# Patient Record
Sex: FEMALE | Race: WHITE | NOT HISPANIC OR LATINO | Employment: FULL TIME | URBAN - METROPOLITAN AREA
[De-identification: names, ages, dates, MRNs, and addresses within clinical notes are randomized per-mention and may not be internally consistent; named-entity substitution may affect disease eponyms.]

---

## 2022-02-25 ENCOUNTER — HOSPITAL ENCOUNTER (INPATIENT)
Facility: CLINIC | Age: 27
LOS: 1 days | Discharge: HOME | End: 2022-02-26
Attending: ADVANCED PRACTICE MIDWIFE | Admitting: REGISTERED NURSE

## 2022-02-25 LAB
ABO/RH(D): NORMAL
ANTIBODY SCREEN: NEGATIVE
BASOPHILS # BLD MANUAL: 0 10E3/UL (ref 0–0.2)
BASOPHILS NFR BLD MANUAL: 0 %
EOSINOPHIL # BLD MANUAL: 0 10E3/UL (ref 0–0.7)
EOSINOPHIL NFR BLD MANUAL: 0 %
ERYTHROCYTE [DISTWIDTH] IN BLOOD BY AUTOMATED COUNT: 12.3 % (ref 10–15)
HCT VFR BLD AUTO: 39.1 % (ref 35–47)
HGB BLD-MCNC: 13.8 G/DL (ref 11.7–15.7)
LYMPHOCYTES # BLD MANUAL: 1 10E3/UL (ref 0.8–5.3)
LYMPHOCYTES NFR BLD MANUAL: 4 %
MCH RBC QN AUTO: 31.2 PG (ref 26.5–33)
MCHC RBC AUTO-ENTMCNC: 35.3 G/DL (ref 31.5–36.5)
MCV RBC AUTO: 88 FL (ref 78–100)
MONOCYTES # BLD MANUAL: 0.7 10E3/UL (ref 0–1.3)
MONOCYTES NFR BLD MANUAL: 3 %
NEUTROPHILS # BLD MANUAL: 22.2 10E3/UL (ref 1.6–8.3)
NEUTROPHILS NFR BLD MANUAL: 93 %
PLAT MORPH BLD: ABNORMAL
PLATELET # BLD AUTO: 138 10E3/UL (ref 150–450)
RBC # BLD AUTO: 4.43 10E6/UL (ref 3.8–5.2)
RBC MORPH BLD: ABNORMAL
SARS-COV-2 RNA RESP QL NAA+PROBE: NEGATIVE
SPECIMEN EXPIRATION DATE: NORMAL
WBC # BLD AUTO: 23.9 10E3/UL (ref 4–11)

## 2022-02-25 PROCEDURE — 86780 TREPONEMA PALLIDUM: CPT | Performed by: REGISTERED NURSE

## 2022-02-25 PROCEDURE — 86850 RBC ANTIBODY SCREEN: CPT | Performed by: REGISTERED NURSE

## 2022-02-25 PROCEDURE — 250N000009 HC RX 250

## 2022-02-25 PROCEDURE — U0003 INFECTIOUS AGENT DETECTION BY NUCLEIC ACID (DNA OR RNA); SEVERE ACUTE RESPIRATORY SYNDROME CORONAVIRUS 2 (SARS-COV-2) (CORONAVIRUS DISEASE [COVID-19]), AMPLIFIED PROBE TECHNIQUE, MAKING USE OF HIGH THROUGHPUT TECHNOLOGIES AS DESCRIBED BY CMS-2020-01-R: HCPCS | Performed by: ADVANCED PRACTICE MIDWIFE

## 2022-02-25 PROCEDURE — 722N000001 HC LABOR CARE VAGINAL DELIVERY SINGLE

## 2022-02-25 PROCEDURE — 0UQMXZZ REPAIR VULVA, EXTERNAL APPROACH: ICD-10-PCS | Performed by: REGISTERED NURSE

## 2022-02-25 PROCEDURE — 250N000011 HC RX IP 250 OP 636: Performed by: REGISTERED NURSE

## 2022-02-25 PROCEDURE — 250N000013 HC RX MED GY IP 250 OP 250 PS 637

## 2022-02-25 PROCEDURE — 120N000002 HC R&B MED SURG/OB UMMC

## 2022-02-25 PROCEDURE — 250N000013 HC RX MED GY IP 250 OP 250 PS 637: Performed by: REGISTERED NURSE

## 2022-02-25 PROCEDURE — 258N000003 HC RX IP 258 OP 636

## 2022-02-25 PROCEDURE — 0KQM0ZZ REPAIR PERINEUM MUSCLE, OPEN APPROACH: ICD-10-PCS | Performed by: REGISTERED NURSE

## 2022-02-25 PROCEDURE — 59409 OBSTETRICAL CARE: CPT | Performed by: REGISTERED NURSE

## 2022-02-25 PROCEDURE — 85027 COMPLETE CBC AUTOMATED: CPT | Performed by: REGISTERED NURSE

## 2022-02-25 RX ORDER — TRANEXAMIC ACID 10 MG/ML
1 INJECTION, SOLUTION INTRAVENOUS EVERY 30 MIN PRN
Status: DISCONTINUED | OUTPATIENT
Start: 2022-02-25 | End: 2022-02-26

## 2022-02-25 RX ORDER — ACETAMINOPHEN 325 MG/1
650 TABLET ORAL EVERY 4 HOURS PRN
Status: DISCONTINUED | OUTPATIENT
Start: 2022-02-25 | End: 2022-02-26

## 2022-02-25 RX ORDER — OXYTOCIN 10 [USP'U]/ML
10 INJECTION, SOLUTION INTRAMUSCULAR; INTRAVENOUS
Status: DISCONTINUED | OUTPATIENT
Start: 2022-02-25 | End: 2022-02-26

## 2022-02-25 RX ORDER — PROCHLORPERAZINE MALEATE 10 MG
10 TABLET ORAL EVERY 6 HOURS PRN
Status: DISCONTINUED | OUTPATIENT
Start: 2022-02-25 | End: 2022-02-26

## 2022-02-25 RX ORDER — ONDANSETRON 2 MG/ML
4 INJECTION INTRAMUSCULAR; INTRAVENOUS EVERY 6 HOURS PRN
Status: DISCONTINUED | OUTPATIENT
Start: 2022-02-25 | End: 2022-02-26

## 2022-02-25 RX ORDER — METOCLOPRAMIDE HYDROCHLORIDE 5 MG/ML
10 INJECTION INTRAMUSCULAR; INTRAVENOUS EVERY 6 HOURS PRN
Status: DISCONTINUED | OUTPATIENT
Start: 2022-02-25 | End: 2022-02-26

## 2022-02-25 RX ORDER — IBUPROFEN 600 MG/1
600 TABLET, FILM COATED ORAL
Status: DISCONTINUED | OUTPATIENT
Start: 2022-02-25 | End: 2022-02-26

## 2022-02-25 RX ORDER — OXYTOCIN/0.9 % SODIUM CHLORIDE 30/500 ML
100-340 PLASTIC BAG, INJECTION (ML) INTRAVENOUS CONTINUOUS PRN
Status: DISCONTINUED | OUTPATIENT
Start: 2022-02-25 | End: 2022-02-26

## 2022-02-25 RX ORDER — SODIUM CHLORIDE, SODIUM LACTATE, POTASSIUM CHLORIDE, CALCIUM CHLORIDE 600; 310; 30; 20 MG/100ML; MG/100ML; MG/100ML; MG/100ML
INJECTION, SOLUTION INTRAVENOUS CONTINUOUS PRN
Status: DISCONTINUED | OUTPATIENT
Start: 2022-02-25 | End: 2022-02-26

## 2022-02-25 RX ORDER — KETOROLAC TROMETHAMINE 30 MG/ML
30 INJECTION, SOLUTION INTRAMUSCULAR; INTRAVENOUS
Status: DISCONTINUED | OUTPATIENT
Start: 2022-02-25 | End: 2022-02-26

## 2022-02-25 RX ORDER — NALOXONE HYDROCHLORIDE 0.4 MG/ML
0.2 INJECTION, SOLUTION INTRAMUSCULAR; INTRAVENOUS; SUBCUTANEOUS
Status: DISCONTINUED | OUTPATIENT
Start: 2022-02-25 | End: 2022-02-26

## 2022-02-25 RX ORDER — OXYTOCIN 10 [USP'U]/ML
INJECTION, SOLUTION INTRAMUSCULAR; INTRAVENOUS
Status: DISCONTINUED
Start: 2022-02-25 | End: 2022-02-25 | Stop reason: WASHOUT

## 2022-02-25 RX ORDER — PROCHLORPERAZINE 25 MG
25 SUPPOSITORY, RECTAL RECTAL EVERY 12 HOURS PRN
Status: DISCONTINUED | OUTPATIENT
Start: 2022-02-25 | End: 2022-02-26

## 2022-02-25 RX ORDER — CARBOPROST TROMETHAMINE 250 UG/ML
250 INJECTION, SOLUTION INTRAMUSCULAR
Status: DISCONTINUED | OUTPATIENT
Start: 2022-02-25 | End: 2022-02-26

## 2022-02-25 RX ORDER — PRENATAL VIT/IRON FUM/FOLIC AC 27MG-0.8MG
1 TABLET ORAL DAILY
COMMUNITY

## 2022-02-25 RX ORDER — MISOPROSTOL 200 UG/1
800 TABLET ORAL
Status: DISCONTINUED | OUTPATIENT
Start: 2022-02-25 | End: 2022-02-26

## 2022-02-25 RX ORDER — LIDOCAINE 40 MG/G
CREAM TOPICAL
Status: DISCONTINUED | OUTPATIENT
Start: 2022-02-25 | End: 2022-02-26

## 2022-02-25 RX ORDER — METHYLERGONOVINE MALEATE 0.2 MG/ML
200 INJECTION INTRAVENOUS
Status: DISCONTINUED | OUTPATIENT
Start: 2022-02-25 | End: 2022-02-26

## 2022-02-25 RX ORDER — SODIUM CHLORIDE, SODIUM LACTATE, POTASSIUM CHLORIDE, CALCIUM CHLORIDE 600; 310; 30; 20 MG/100ML; MG/100ML; MG/100ML; MG/100ML
INJECTION, SOLUTION INTRAVENOUS CONTINUOUS
Status: DISCONTINUED | OUTPATIENT
Start: 2022-02-25 | End: 2022-02-26

## 2022-02-25 RX ORDER — MISOPROSTOL 200 UG/1
TABLET ORAL
Status: COMPLETED
Start: 2022-02-25 | End: 2022-02-25

## 2022-02-25 RX ORDER — NALOXONE HYDROCHLORIDE 0.4 MG/ML
0.4 INJECTION, SOLUTION INTRAMUSCULAR; INTRAVENOUS; SUBCUTANEOUS
Status: DISCONTINUED | OUTPATIENT
Start: 2022-02-25 | End: 2022-02-26

## 2022-02-25 RX ORDER — METOCLOPRAMIDE 10 MG/1
10 TABLET ORAL EVERY 6 HOURS PRN
Status: DISCONTINUED | OUTPATIENT
Start: 2022-02-25 | End: 2022-02-26

## 2022-02-25 RX ORDER — OXYTOCIN/0.9 % SODIUM CHLORIDE 30/500 ML
PLASTIC BAG, INJECTION (ML) INTRAVENOUS
Status: COMPLETED
Start: 2022-02-25 | End: 2022-02-25

## 2022-02-25 RX ORDER — SODIUM CHLORIDE, SODIUM LACTATE, POTASSIUM CHLORIDE, CALCIUM CHLORIDE 600; 310; 30; 20 MG/100ML; MG/100ML; MG/100ML; MG/100ML
INJECTION, SOLUTION INTRAVENOUS
Status: COMPLETED
Start: 2022-02-25 | End: 2022-02-25

## 2022-02-25 RX ORDER — OXYTOCIN/0.9 % SODIUM CHLORIDE 30/500 ML
340 PLASTIC BAG, INJECTION (ML) INTRAVENOUS CONTINUOUS PRN
Status: DISCONTINUED | OUTPATIENT
Start: 2022-02-25 | End: 2022-02-26

## 2022-02-25 RX ORDER — MISOPROSTOL 200 UG/1
400 TABLET ORAL
Status: DISCONTINUED | OUTPATIENT
Start: 2022-02-25 | End: 2022-02-26

## 2022-02-25 RX ORDER — ONDANSETRON 4 MG/1
4 TABLET, ORALLY DISINTEGRATING ORAL EVERY 6 HOURS PRN
Status: DISCONTINUED | OUTPATIENT
Start: 2022-02-25 | End: 2022-02-26

## 2022-02-25 RX ORDER — LIDOCAINE HYDROCHLORIDE 10 MG/ML
INJECTION, SOLUTION EPIDURAL; INFILTRATION; INTRACAUDAL; PERINEURAL
Status: COMPLETED
Start: 2022-02-25 | End: 2022-02-25

## 2022-02-25 RX ORDER — OXYTOCIN/0.9 % SODIUM CHLORIDE 30/500 ML
1-24 PLASTIC BAG, INJECTION (ML) INTRAVENOUS CONTINUOUS
Status: DISCONTINUED | OUTPATIENT
Start: 2022-02-25 | End: 2022-02-26

## 2022-02-25 RX ADMIN — LIDOCAINE HYDROCHLORIDE 20 ML: 10 INJECTION, SOLUTION EPIDURAL; INFILTRATION; INTRACAUDAL; PERINEURAL at 19:41

## 2022-02-25 RX ADMIN — IBUPROFEN 600 MG: 600 TABLET, FILM COATED ORAL at 20:40

## 2022-02-25 RX ADMIN — ACETAMINOPHEN 650 MG: 325 TABLET, FILM COATED ORAL at 23:30

## 2022-02-25 RX ADMIN — MISOPROSTOL 800 MCG: 200 TABLET ORAL at 19:31

## 2022-02-25 RX ADMIN — SODIUM CHLORIDE, POTASSIUM CHLORIDE, SODIUM LACTATE AND CALCIUM CHLORIDE 500 ML: 600; 310; 30; 20 INJECTION, SOLUTION INTRAVENOUS at 17:38

## 2022-02-25 RX ADMIN — Medication 2 MILLI-UNITS/MIN: at 18:04

## 2022-02-25 RX ADMIN — METHYLERGONOVINE MALEATE 200 MCG: 0.2 INJECTION INTRAVENOUS at 19:29

## 2022-02-25 ASSESSMENT — ACTIVITIES OF DAILY LIVING (ADL)
TOILETING_ISSUES: NO
CONCENTRATING,_REMEMBERING_OR_MAKING_DECISIONS_DIFFICULTY: NO
WEAR_GLASSES_OR_BLIND: NO
DOING_ERRANDS_INDEPENDENTLY_DIFFICULTY: NO
WALKING_OR_CLIMBING_STAIRS_DIFFICULTY: NO
CHANGE_IN_FUNCTIONAL_STATUS_SINCE_ONSET_OF_CURRENT_ILLNESS/INJURY: NO
DRESSING/BATHING_DIFFICULTY: NO
DIFFICULTY_EATING/SWALLOWING: NO
FALL_HISTORY_WITHIN_LAST_SIX_MONTHS: NO

## 2022-02-25 NOTE — H&P
"ADMIT NOTE  =================  41w0d    Rosita Parmar is a 26 year old female with an No LMP recorded. Patient is pregnant. and Estimated Date of Delivery: 2022 is [facility] to the [facility] on 2022 at 5:21 PM with prolonged second stage.     HPI  ================  Patient with planned Out of Hospital Birth is transferring by car to the hospital for maternal exhaustion and prolonged second stage of labor  Patient has been [facility] by [facility] for prenatal care.  Transferring midwife name(s),/birth center & phone number: Deb Sepulveda; [phone]  Midwife here supporting patient: Here with her Angy Schwarz  Records received, reviewed and scanned into chart.    [employer] transfer tool was used    Presents here with spouse and . Reports that her contractions started around 0100 and had AROM at approx 0400 for clear fluid. She was 9.5cm and began pushing at 1035 this morning, she continued to push on and off until 1530 when recommendation was made to transfer care. Her prenatal course was uncomplicated.    Contractions- moderate  Fetal movement- active  ROM- yes, clear. SROM @ 0400   Vaginal bleeding- small  GBS- negative  FOB- is involved, Carlo  Other labor support- Angy Schwarz    Weight gain- 142 lbs pre-pregnant, no documented gain in prenatal care records  Total weight gain- unknown   Height- 5'8\"  BMI- 21.6  First prenatal visit at 18 weeks, Total visits- 9  Received prenatal care through [facility].     PROBLEM LIST  =================  Patient Active Problem List    Diagnosis Date Noted     Labor and delivery, indication for care 2022     Priority: Medium     HISTORIES  ============  No Known Allergies  No past medical history on file.  No past surgical history on file..  No family history on file.  Social History     Tobacco Use     Smoking status: Not on file     Smokeless tobacco: Not on file   Substance Use Topics     Alcohol use: Not on file     OB " History    Para Term  AB Living   1 0 0 0 0 0   SAB IAB Ectopic Multiple Live Births   0 0 0 0 0      # Outcome Date GA Lbr Ian/2nd Weight Sex Delivery Anes PTL Lv   1 Current                 LABS:   ===========  Prenatal Labs reviewed per transfer records: Yes  Rhogam not indicated  ABO/ RH: O POS  Rubella NON-immune   HBsAg: negative   HIV: negative   RPR: NR   GCT: date2021, result 99  GBS: date 22, result Negative  OTHER: n/a    Lab Results   Component Value Date    HGB 13.8 2022     Other labs:  Results for orders placed or performed during the hospital encounter of 22 (from the past 24 hour(s))   ABO/Rh type and screen    Narrative    The following orders were created for panel order ABO/Rh type and screen.  Procedure                               Abnormality         Status                     ---------                               -----------         ------                     Adult Type and Screen[[id number]]                            In process                   Please view results for these tests on the individual orders.   CBC with platelets differential    Narrative    The following orders were created for panel order CBC with platelets differential.  Procedure                               Abnormality         Status                     ---------                               -----------         ------                     CBC with platelets and d...[[id number]]  Abnormal            Preliminary result           Please view results for these tests on the individual orders.   CBC with platelets and differential   Result Value Ref Range    WBC Count 23.9 (H) 4.0 - 11.0 10e3/uL    RBC Count 4.43 3.80 - 5.20 10e6/uL    Hemoglobin 13.8 11.7 - 15.7 g/dL    Hematocrit 39.1 35.0 - 47.0 %    MCV 88 78 - 100 fL    MCH 31.2 26.5 - 33.0 pg    MCHC 35.3 31.5 - 36.5 g/dL    RDW 12.3 10.0 - 15.0 %    Platelet Count 138 (L) 150 - 450 10e3/uL       ULTRASOUND  =============  Date  "10/08/2021, result normal fetal survey    ROS  =========  Pt denies significant respiratory, cardiovacular, GI, or muscular/skeletalcomplaints.    See RN data base ROS.     PHYSICAL EXAM:  ===============  Ht 1.727 m (5' 8\")   Wt 64.4 kg (142 lb)   BMI 21.59 kg/m    General appearance: uncomfortable with contractions  GENERAL APPEARANCE: healthy, alert and no distress  RESP: breathing comfortably on room air  CV: well perfused  ABDOMEN:  soft, nontender, no epigastric pain  SKIN: no suspicious lesions or rashes  NEURO: Denies headache, blurred vision, other vision changes  PSYCH: mentation appears normal. and affect normal/bright  Legs: No edema     Abdomen: gravid, vertex fetus per Leopold's, non-tender between contractions.   Cephalic presentation confirmed by digital exam, sutures palpated, LOP  EFW-  7.5 lbs.   CONTACTIONS: every 5-8 minutes  FETAL HEART TONES: continuous EFM- baseline 120 with moderate variability and positive accelerations. Intermittent variable and late decelerations.   PELVIC EXAM: 10/ 100%/ +1; +2 with pushing  BLOODY SHOW: yes    ROM:yes, clear SROM @ 0400  FLUID: clear  ROMPLUS: not done    # Pain Assessment:   - Rosita is experiencing pain due to contractions. Pain management was discussed with Rosita and her spouse and the plan was created in a collaborative fashion.  Rosita's response to the current recommendations: engaged  - Declines medication mgmt prefers position changes and breathing techniques    ASSESSMENT:  ==============  IUP @ 41w0d admitted in second stage   NST NOT DONE   Fetal Heart Rate - category two  GBS- negative  Prolonged second stage  Irregular contraction pattern   COVID unknown      PLAN:  ===========  Admit - see IP orders  Reviewed FHR tracing with pt, laury and SO. IV fluid bolus given.  Discussed labor augmentation with pitocin, reviewed risks/benefits. Pt is agreeable. Orders placed. Discussed that contractions are spaced and encouraged pt not to push " until contractions are closer together. Pt in agreement.  Dr. Owens updated and aware of category 2 tracing, moderate variability with intermittent late and variable decels, improved by position changes and IV fluids. MD on call and agrees with plan.  Anticipate   Reevaluate in 30 minutes/sooner PRN    CAROL Eduardo CNM

## 2022-02-26 VITALS
WEIGHT: 142 LBS | TEMPERATURE: 97.5 F | DIASTOLIC BLOOD PRESSURE: 79 MMHG | HEART RATE: 66 BPM | BODY MASS INDEX: 21.52 KG/M2 | RESPIRATION RATE: 16 BRPM | OXYGEN SATURATION: 99 % | HEIGHT: 68 IN | SYSTOLIC BLOOD PRESSURE: 120 MMHG

## 2022-02-26 LAB
HGB BLD-MCNC: 12.7 G/DL (ref 11.7–15.7)
T PALLIDUM AB SER QL: NONREACTIVE

## 2022-02-26 PROCEDURE — 36415 COLL VENOUS BLD VENIPUNCTURE: CPT | Performed by: REGISTERED NURSE

## 2022-02-26 PROCEDURE — 85018 HEMOGLOBIN: CPT | Performed by: REGISTERED NURSE

## 2022-02-26 PROCEDURE — 250N000013 HC RX MED GY IP 250 OP 250 PS 637: Performed by: REGISTERED NURSE

## 2022-02-26 RX ORDER — HYDROCORTISONE 2.5 %
CREAM (GRAM) TOPICAL 3 TIMES DAILY PRN
Status: DISCONTINUED | OUTPATIENT
Start: 2022-02-26 | End: 2022-02-26 | Stop reason: HOSPADM

## 2022-02-26 RX ORDER — METHYLERGONOVINE MALEATE 0.2 MG/ML
200 INJECTION INTRAVENOUS
Status: DISCONTINUED | OUTPATIENT
Start: 2022-02-26 | End: 2022-02-26 | Stop reason: HOSPADM

## 2022-02-26 RX ORDER — MODIFIED LANOLIN
OINTMENT (GRAM) TOPICAL
Status: DISCONTINUED | OUTPATIENT
Start: 2022-02-26 | End: 2022-02-26 | Stop reason: HOSPADM

## 2022-02-26 RX ORDER — IBUPROFEN 800 MG/1
800 TABLET, FILM COATED ORAL EVERY 6 HOURS PRN
Status: DISCONTINUED | OUTPATIENT
Start: 2022-02-26 | End: 2022-02-26 | Stop reason: HOSPADM

## 2022-02-26 RX ORDER — MISOPROSTOL 200 UG/1
800 TABLET ORAL
Status: DISCONTINUED | OUTPATIENT
Start: 2022-02-26 | End: 2022-02-26 | Stop reason: HOSPADM

## 2022-02-26 RX ORDER — CARBOPROST TROMETHAMINE 250 UG/ML
250 INJECTION, SOLUTION INTRAMUSCULAR
Status: DISCONTINUED | OUTPATIENT
Start: 2022-02-26 | End: 2022-02-26 | Stop reason: HOSPADM

## 2022-02-26 RX ORDER — ACETAMINOPHEN 325 MG/1
650 TABLET ORAL EVERY 6 HOURS PRN
Qty: 100 TABLET | Refills: 0 | COMMUNITY
Start: 2022-02-26

## 2022-02-26 RX ORDER — DOCUSATE SODIUM 100 MG/1
100 CAPSULE, LIQUID FILLED ORAL DAILY
Status: DISCONTINUED | OUTPATIENT
Start: 2022-02-26 | End: 2022-02-26 | Stop reason: HOSPADM

## 2022-02-26 RX ORDER — IBUPROFEN 600 MG/1
600 TABLET, FILM COATED ORAL EVERY 6 HOURS PRN
Qty: 60 TABLET | Refills: 0 | COMMUNITY
Start: 2022-02-26

## 2022-02-26 RX ORDER — MISOPROSTOL 200 UG/1
400 TABLET ORAL
Status: DISCONTINUED | OUTPATIENT
Start: 2022-02-26 | End: 2022-02-26 | Stop reason: HOSPADM

## 2022-02-26 RX ORDER — OXYTOCIN 10 [USP'U]/ML
10 INJECTION, SOLUTION INTRAMUSCULAR; INTRAVENOUS
Status: DISCONTINUED | OUTPATIENT
Start: 2022-02-26 | End: 2022-02-26 | Stop reason: HOSPADM

## 2022-02-26 RX ORDER — ACETAMINOPHEN 325 MG/1
650 TABLET ORAL EVERY 4 HOURS PRN
Status: DISCONTINUED | OUTPATIENT
Start: 2022-02-26 | End: 2022-02-26 | Stop reason: HOSPADM

## 2022-02-26 RX ORDER — AMOXICILLIN 250 MG
1 CAPSULE ORAL DAILY
Qty: 100 TABLET | Refills: 0 | COMMUNITY
Start: 2022-02-26

## 2022-02-26 RX ORDER — OXYTOCIN/0.9 % SODIUM CHLORIDE 30/500 ML
340 PLASTIC BAG, INJECTION (ML) INTRAVENOUS CONTINUOUS PRN
Status: DISCONTINUED | OUTPATIENT
Start: 2022-02-26 | End: 2022-02-26 | Stop reason: HOSPADM

## 2022-02-26 RX ORDER — OMEGA-3/DHA/EPA/FISH OIL 60 MG-90MG
CAPSULE ORAL DAILY
COMMUNITY

## 2022-02-26 RX ORDER — TRANEXAMIC ACID 10 MG/ML
1 INJECTION, SOLUTION INTRAVENOUS EVERY 30 MIN PRN
Status: DISCONTINUED | OUTPATIENT
Start: 2022-02-26 | End: 2022-02-26 | Stop reason: HOSPADM

## 2022-02-26 RX ORDER — BISACODYL 10 MG
10 SUPPOSITORY, RECTAL RECTAL DAILY PRN
Status: DISCONTINUED | OUTPATIENT
Start: 2022-02-26 | End: 2022-02-26 | Stop reason: HOSPADM

## 2022-02-26 RX ORDER — ERGOCALCIFEROL (VITAMIN D2) 10 MCG
TABLET ORAL DAILY
COMMUNITY

## 2022-02-26 RX ADMIN — ACETAMINOPHEN 650 MG: 325 TABLET, FILM COATED ORAL at 07:45

## 2022-02-26 RX ADMIN — ACETAMINOPHEN 650 MG: 325 TABLET, FILM COATED ORAL at 02:56

## 2022-02-26 RX ADMIN — IBUPROFEN 800 MG: 800 TABLET, FILM COATED ORAL at 09:56

## 2022-02-26 RX ADMIN — IBUPROFEN 800 MG: 800 TABLET, FILM COATED ORAL at 02:56

## 2022-02-26 NOTE — PLAN OF CARE
Data: Rosita Parmar transferred to [place] [address] via wheelchair at 2230. Baby transferred via parent's arms.  Action: Receiving unit notified of transfer: Yes. Patient and family notified of room change. Report given by Kathrin SANCHEZ RN prior to transfer. Belongings sent to receiving unit. Accompanied by Registered Nurse. Oriented patient to surroundings. Call light within reach. ID bands double-checked with receiving RN.  Response: Patient tolerated transfer and is stable.      Data: Vital signs within normal limits. Postpartum checks within normal limits - see flow record. Patient eating and drinking normally. Patient able to empty bladder independently and is up ambulating. No apparent signs of infection. Perineum healing well. Patient has ice placed.  Patient performing self cares and is able to care for infant.  Breastfeeding is the feeding plan.    Action: Patient medicated during the shift for pain. See MAR. Patient reassessed within 1 hour after each medication and pain was improved - patient stated she was comfortable. Patient education done about breastfeeding. See flow record.  Response: Positive attachment behaviors observed with infant. Support persons was present and attentive.   Plan: Anticipate discharge on as soon as possible.

## 2022-02-26 NOTE — PLAN OF CARE
Pt admitted from home after laboring/pushing. Had been complete and pushing on/off since about 1030 this morning. Monitors placed, admission done as able. EFM showed late decelerations initially to the 70-80s. IV started with bolus, pt to left side with improvement. Pitocin started per protocol. Contractions increased from 4-7 minutes apart to 2-5 minutes. Pt began to push again. Prolonged decel to 80s with pushing, slowly increased to 100-110 until delivery. Pt to left side. HUNTER Cardona CNM at bedside for pushing. Viable baby boy born via  at 1924 with NICU present for delivery and DUSTIN Allan RN present after for nursery cares. Postpartum recovery WNL after methergine and cytotec given for increased bleeding. VSS. Pt able to void, pain controlled with ibuprofen. -140/70-80s. Updated HUNTER Cardona CNM.    Data: Rosita Parmar transferred to [id number] via wheelchair at 2230. Baby transferred via parent's arms.  Action: Receiving unit notified of transfer: Yes. Patient and family notified of room change. Report given to MILTON Sosa at prior to transfer. Belongings sent to receiving unit. Accompanied by Registered Nurse. Oriented patient to surroundings. Call light within reach. ID bands double-checked with receiving RN.  Response: Patient tolerated transfer and is stable.

## 2022-02-26 NOTE — PLAN OF CARE
"Goal Outcome Evaluation:  VSS. Postpartum assessment WNL. C/o perineal pain, well managed with ibuprofen and tylenol and use of tucks and ice pads. EDS of 8, with answer of \"1\" on question 10. Provider and SW notified. SW contacted pt via phone. Pt states she has breast pump already at home. Pt watched required educational videos. Birth certificate completed. Reviewed AVS. Pt discharged to home with spouse and baby.                           "

## 2022-02-26 NOTE — PROGRESS NOTES
"On this date, MARSHA received page from RN about concerns with a score of a 1 on the last question of the EDS screener. SW contacted Rosita via bedside phone to discuss score. Rosita denied being surprised about her score and informed MARSHA that she has experienced a number of losses in her life and near the end of pregnancy. Rosita informed MARSHA that she has had thoughts in the past of harming herself and has \"painful thoughts\" during her pregnancy. Rosita informed MARSHA that she recognized these thoughts and discovered a grief support therapist and specialist to address her mental health. Rosita also attends a grief support group. Both services are weekly. Rosita denied the need for medication, although it has been discussed among her team. Rosita denied the need for additional resources at this time to address any current stressors.     No further follow-up needs identified at this time.         CHERYL Burnette, MARTÍN, [facility] [place]  Pediatric Float    Office: [phone]  Email: [email or web address]  After hours and weekend pager: [phone]  *NO LETTER*    "

## 2022-02-26 NOTE — DISCHARGE SUMMARY
[facility] Discharge Summary    Rosita Parmar MRN# [id number]   Age: 26 year old YOB: 1995     Date of Admission:  2022  Date of Discharge::  2022  Admitting Physician:  Savi Grady, CAROL ROSEN  Discharge Physician:  CAROL Sánchez CNM, CNBENOIT, MS      [facility] clinic: [facility]           Admission Diagnoses:   Labor and delivery, indication for care [O75.9]  Prolonged second stage          Discharge Diagnosis:     Normal spontaneous vaginal delivery at 41w0d  Second degree perineal laceration          Procedures:     Procedure(s): Repair of second degree perineal laceration                Medications Prior to Admission:     Medications Prior to Admission   Medication Sig Dispense Refill Last Dose     Bioflavonoid Products (VITAMIN C) CHEW Take by mouth daily        calcium-magnesium (CALMAG) 500-250 MG TABS per tablet Take 1 tablet by mouth daily        fish oil-omega-3 fatty acids 500 MG capsule Take by mouth daily        Prenatal Vit-Fe Fumarate-FA (PRENATAL MULTIVITAMIN W/IRON) 27-0.8 MG tablet Take 1 tablet by mouth daily        Vitamin D, Cholecalciferol, 10 MCG (400 UNIT) TABS Take by mouth daily                Discharge Medications:     Current Discharge Medication List      START taking these medications    Details   acetaminophen (TYLENOL) 325 MG tablet Take 2 tablets (650 mg) by mouth every 6 hours as needed for mild pain Start after Delivery.  Qty: 100 tablet, Refills: 0    Associated Diagnoses:  (normal spontaneous vaginal delivery)      ibuprofen (ADVIL/MOTRIN) 600 MG tablet Take 1 tablet (600 mg) by mouth every 6 hours as needed for moderate pain Start after delivery  Qty: 60 tablet, Refills: 0    Associated Diagnoses:  (normal spontaneous vaginal delivery)      senna-docusate (SENOKOT-S/PERICOLACE) 8.6-50 MG tablet Take 1 tablet by mouth daily Start after delivery.  Qty: 100 tablet, Refills: 0    Associated Diagnoses:  (normal spontaneous vaginal  delivery)         CONTINUE these medications which have NOT CHANGED    Details   Bioflavonoid Products (VITAMIN C) CHEW Take by mouth daily      calcium-magnesium (CALMAG) 500-250 MG TABS per tablet Take 1 tablet by mouth daily      fish oil-omega-3 fatty acids 500 MG capsule Take by mouth daily      Prenatal Vit-Fe Fumarate-FA (PRENATAL MULTIVITAMIN W/IRON) 27-0.8 MG tablet Take 1 tablet by mouth daily      Vitamin D, Cholecalciferol, 10 MCG (400 UNIT) TABS Take by mouth daily                   Consultations:   No consultations were requested during this admission          Brief History of Labor:   Labor Course:   Per homebirth CPM report: patient's contractions started this morning at 0100. At 0400 SROM, clear. At 1035 lip, pushing started. At 1530 call to [facility] to JUANCARLOS, prolonged second stage and maternal exhaustion, some audible decreases in FHR with doppler. At 1646 admit to [facility], IV inserted, bolus, cat 2 tracing, declined exam. At 1745 SVE 10/100/+2, LOP, good movement with pushing, contractions traced every 5-8min. Pitocin started due to dysfunctional contraction pattern.      Delivery Course:  Pt became complete at 1130 and started pushing 1130 this morning. She was transferred from a homebirth CPM at 1645 due to contraction spacing and maternal exhaustion from pushing  (See HPI for further details). Pushing resumed at 1906. Pushed to  @ 1908. NICU called to delivery due to Cat II FHR tracing. She started pushing in semi-fowlers per her request but audible prolonged FHR deceleration heard in the 90s when traced and CNM request for side lying positioning. Patient felt another contraction and continued to push in semi-fowlers for 1 contraction before turning to left lateral at 1913. Continued to push with good effort through two more contractions. FHR audible in 100s @ 1917 between contractions. Urged patient to push with greatest effort possible due to FHR. Patient pushed with coaching with contraction  at 1919. FHR baseline improved to 115bpm between 6499-4929. Delivered a vigorous baby male at 1924 who was immediately placed on mom's abdomen. Nuchal x1 loose and delivered through. IV pitocin incrased after delivery of infant per protocol. Umbilical cord was left intact per patient request. Unable to cut cord segment cut for gases due to patient request for it to be left intact. Cord blood unable to be obtained due to intact cord. Placenta spontaneously delivered intact at 192 without difficulty via Schultze mechanism. Brisk bleeding noted after delivery of placenta despite uterine massage. 800mcg rectal misoprostol and 0.2mg IM methergine given with patient consent. After administration, bleeding slowed and fundus was firm, midline. Inspection of vagina and perineum revealed a 2nd degree laceration and left labial. 2nd degree laceration was repaired in the usual fashion with 3-0 vicryl. Left labial laceration was repaired with single interrupted 4-0 vicryl suture. 1% lidocaine was infiltrated before the repair.  At the completion of repairs, fundus is firm and midline.  Mom and baby are stable.      IUP at 41 weeks gestation delivered on 2022.    , with 2nd degree laceration delivery of a viable Male infant.  Weight : 7lbs 0.9oz  Apgars of 8 at 1 minute and 9 at 5 minutes.  Labor was augmented.  Medications administered  in labor:  Pain Rx none; Antibiotics No  Perineum: 2nd degree and Labial laceration  Placenta-mechanism: spontaneous, intact,  with a 3 vessel cord. IV oxytocin was given. Rectal misoprostol was given. IM methergine given.  QBL was 809.  Anticipated Discharge Date: 22  Complications of pregnancy, labor and delivery: Dysfunctional labor  Birth attendants:Nivia Cardona, CAROL, CNM     Assessment Day of Discharge      Breasts: soft, filling  Nipples:intact, without lesion  Fundus: firm @ umbilicus, midline, nontender  Abdomen: soft, nontender  Lochia: small rubra, no  "clots,  No odor  Perineum: well approximated, healing well, no erythema, edema, bruising, hematoma or s/s of infection. 1cm hemorrhoid  Legs: no edema, nontender           Hospital Course:     INTERVAL HISTORY:  /79 (BP Location: Left arm, Patient Position: Supine, Cuff Size: Adult Regular)   Pulse 66   Temp 97.5  F (36.4  C) (Oral)   Resp 16   Ht 1.727 m (5' 8\")   Wt 64.4 kg (142 lb)   SpO2 99%   Breastfeeding Unknown   BMI 21.59 kg/m     No fever  Pt stable, baby [place] is rooming in. They are planning to discharge this morning and continue care with their home birth midwife  Breast feeding status: initiated. Seen at right breast with deep latch, non painful.   Complications since 2 hours post delivery: None  Patient is tolerating activity well, and normal diet. Voiding without difficulty, loose bowel movement yet, cramping is relieved by Ibuprofen, lochia is decreasing and patient denies clots.  Perineal pain is is improved by Ibuprofen and acetaminophen, but still sore.      Postpartum hemoglobin   Recent Labs   Lab 02/26/22  0753 02/25/22  1722   HGB 12.7 13.8      Blood type: O positive   Rubella status: Not immune - considering MMR   Postpartum depression warning signs reviewed.     ASSESSMENT/PLAN:  Normal postpartum exam , Stable Post-partum day #1  Complications:vaccination status Rubella non-immune, plan MMR prior to discharge if patient is agreeable  Home Visit Ordered- No, planning follow-up and postpartum care with [facility].   Plan d/c home today  Follow-up: later today scheduled with home birth midwife   Teaching done: D/C Instructions: Nutrition/Activity, Engorgement Management, Warning Signs/When to Call: Excessive Bleeding, Infection, PP Depression, RTC Clinic for PP Appointment and PNV     Reviewed blood pressure warning signs including headache, vision changes, RUQ/epigastric pain, N&V, or sudden swelling.     Continue prenatal vitamins  Discharge medications ordered- " "none           Discharge Instructions and Follow-Up:     Discharge diet: Regular   Discharge activity: Pelvic rest: abstain from intercourse and do not use tampons for 6 week(s)   Discharge follow-up: Follow up with [facility] later today and as previously scheduled   Wound care: Drink plenty of fluids  Ice to area for comfort  Keep wound clean and dry  Warm sitz baths  Witch hazel pads           Discharge Disposition:     Discharged to home        CAROL Sánchez, SILAS    ADDEDNDUM at 1019:   Page from RN indicates patient completed EDS with score of 8 including answering 1, \"hardly ever\" to question 10, \"Thought of hurting myself.\" RN enter social work consult per protocol. SW plans to discuss with patient prior to discharge.     CAROL Sánchez CNM           "

## 2022-02-26 NOTE — L&D DELIVERY NOTE
Delivery Summary    Rosita Parmar MRN# [id number]   Age: 26 year old YOB: 1995     ASSESSMENT & PLAN:     Delivery Note    Labor Course:   Per homebirth CPM report: patient's contractions started this morning at 0100. At 0400 SROM, clear. At 1035 lip, pushing started. At 1530 call to [facility] to JUANCARLOS, prolonged second stage and maternal exhaustion, some audible decreases in FHR with doppler. At 1646 admit to [facility], IV inserted, bolus, cat 2 tracing, declined exam. At 1745 SVE 10/100/+2, LOP, good movement with pushing, contractions traced every 5-8min. Pitocin started due to dysfunctional contraction pattern.     Delivery Course:  Pt became complete at 1130 and started pushing 1130 this morning. She was transferred from a homebirth CPM at 1645 due to contraction spacing and maternal exhaustion from pushing  (See HPI for further details). Pushing resumed at 1906. Pushed to  @ 1908. NICU called to delivery due to Cat II FHR tracing. She started pushing in semi-fowlers per her request but audible prolonged FHR deceleration heard in the 90s when traced and CNM request for side lying positioning. Patient felt another contraction and continued to push in semi-fowlers for 1 contraction before turning to left lateral at 1913. Continued to push with good effort through two more contractions. FHR audible in 100s @ 1917 between contractions. Urged patient to push with greatest effort possible due to FHR. Patient pushed with coaching with contraction at 1919. FHR baseline improved to 115bpm between 4273-0406. Delivered a vigorous baby male at 1924 who was immediately placed on mom's abdomen. Nuchal x1 loose and delivered through. IV pitocin incrased after delivery of infant per protocol. Umbilical cord was left intact per patient request. Unable to cut cord segment cut for gases due to patient request for it to be left intact. Cord blood unable to be obtained due to intact cord. Placenta spontaneously  delivered intact at 1928 without difficulty via Schultze mechanism. Brisk bleeding noted after delivery of placenta despite uterine massage. 800mcg rectal misoprostol and 0.2mg IM methergine given with patient consent. After administration, bleeding slowed and fundus was firm, midline. Inspection of vagina and perineum revealed a 2nd degree laceration and left labial. 2nd degree laceration was repaired in the usual fashion with 3-0 vicryl. Left labial laceration was repaired with single interrupted 4-0 vicryl suture. 1% lidocaine was infiltrated before the repair.  At the completion of repairs, fundus is firm and midline.  Mom and baby are stable.      IUP at 41 weeks gestation delivered on 2022.    , with 2nd degree laceration delivery of a viable Male infant.  Weight : 7lbs 0.9oz  Apgars of 8 at 1 minute and 9 at 5 minutes.  Labor was augmented.  Medications administered  in labor:  Pain Rx none; Antibiotics No  Perineum: 2nd degree and Labial laceration  Placenta-mechanism: spontaneous, intact,  with a 3 vessel cord. IV oxytocin was given. Rectal misoprostol was given. IM methergine given.  QBL was 809.  Anticipated Discharge Date: 22  Complications of pregnancy, labor and delivery: Dysfunctional labor  Birth attendants:CAROL Pyle, Jaycee Baker [[id number]]    Labor Event Times    Labor onset date: 22 Onset time:  1:00 AM   Dilation complete date: 22 Complete time: 11:30 AM   Start pushing date/time: 2022 1130      Labor Events     labor?: No   steroids: None  Labor Type: Spontaneous  Predominate monitoring during 1st stage: intermittent auscultation, continuous electronic fetal monitoring     Rupture date/time: 22 0400   Rupture type: Spontaneous rupture of membranes occuring during spontaneous labor or augmentation  Fluid color: Clear  Fluid odor: Normal     Augmentation: Oxytocin  Indications for augmentation:  Ineffective Contraction Pattern  1:1 continuous labor support provided by?:        Delivery/Placenta Date and Time    Delivery Date: 22 Delivery Time:  7:24 PM   Placenta Date/Time: 2022  7:28 PM  Oxytocin given at the time of delivery: after delivery of baby  Delivering clinician: Nivia Cardona APRN CNM   Other personnel present at delivery:  Provider Role   Kathrin Raymundo RN Gallagher, Ryann L, RN          Vaginal Counts     Initial count performed by 2 team members:  Two Team Members   Jack Pérez       [place] Suture Needles Sponges (RETIRED) Instruments   Initial counts 2  5    Added to count  1     Relief counts       Final counts 2 1 5          Placed during labor Accounted for at the end of labor   FSE No NA   IUPC No NA   Cervadil No NA              Final count performed by 2 team members:  Two Team Members   Jack Andrade         Apgars    Living status: Living   1 Minute 5 Minute 10 Minute 15 Minute 20 Minute   Skin color: 0        Heart rate: 2        Reflex irritability: 2        Muscle tone: 2        Respiratory effort: 2        Total: 8        Apgars assigned by: SHAD RING NNP/     Cord    Cord Complications: Nuchal   Nuchal Intervention: delivered through         Nuchal cord description: loose nuchal cord         Stem cell collection?: No       [place] Resuscitation    Methods: None   Care at Delivery: Asked by Savi MEDINA CNM to attend the delivery of this term, male infant with a gestational age of 41 0/7 weeks secondary to heart rate decelerations.      Delayed cord clamping was completed for 60+ seconds.  The infant was stimulated, cried and had spontaneous respirations during delayed cord clamping.  Infant vigorous with good tone on mother's abdomen.  Gross PE is WNL except for cephalohematoma on left posterior occiput. NICU team called off at about 2 minutes of life. Handoff to nursery nurse given. They will keep a close eye on  cephalohematoma and notify provider if there is any change size or additional swelling and with any other concerns.  Infant will be transferred to the  nursery for further care.    Keyla Tyler NN  2022 7:55 PM           Labor Events and Shoulder Dystocia    Fetal Tracing Prior to Delivery: Category 2  Fetal Tracing Comments: decelerations with pushing contractions; moderate variability throughout.  Shoulder dystocia present?: Neg     Delivery (Maternal) (Provider to Complete) ([id number])    Episiotomy: None  Perineal lacerations: 2nd Repaired?: Yes   Labial laceration: left Repaired?: Yes   Repair suture: 3-0 Vicryl, 4-0 Vicryl  Number of repair packets: 2  Genital tract inspection done: Pos     Blood Loss  Mother: Rosita Parmar #[id number]   Start of Mother's Information    Delivery Blood Loss  22 0100 - 22 2046    Delivery QBL (mL) Hospital Encounter 809 mL    Total  809 mL         End of Mother's Information  Mother: Rosita Parmar #[id number]          Delivery - Provider to Complete ([id number])    Delivering clinician: Nivia Cardona APRN CNM CNM Care: Any CNM care in labor, Exclusive CNM care in labor  Attempted Delivery Types (Choose all that apply): Spontaneous Vaginal Delivery  Delivery Type (Choose the 1 that will go to the Birth History): Vaginal, Spontaneous                   Other personnel:  Provider Role   Kathrin Raymundo, Jasmyn Jimenez RN                 Placenta    Date/Time: 2022  7:28 PM  Removal: Spontaneous  Comments: cord left intact (infant attached to placenta) per patient request  Disposition: Patient possesion           Anesthesia    Method: None                Presentation and Position    Presentation: Vertex    Position: Left Occiput Anterior                 CAROL Pyle CNM

## 2022-02-26 NOTE — DISCHARGE INSTRUCTIONS
Deagay Bceker,  Congratulations on the birth of baby Rebeca! Thank you for letting the midwives from the [facility] care for you during this special time. Please call us at [phone] with any questions or concerns.   Niki Howe CNM    Postpartum Vaginal Delivery Instructions    Activity       Ask family and friends for help when you need it.    Do not place anything in your vagina for 6 weeks.    You are not restricted on other activities, but take it easy for a few weeks to allow your body to recover from delivery.  You are able to do any activities you feel up to that point.    No driving until you have stopped taking your pain medications (usually two weeks after delivery).     Call your health care provider if you have any of these symptoms:       Increased pain, swelling, redness, or fluid around your stiches from an episiotomy or perineal tear.    A fever above 100.4 F (38 C) with or without chills when placing a thermometer under your tongue.    You soak a sanitary pad with blood within 1 hour, or you see blood clots larger than a golf ball.    Bleeding that lasts more than 6 weeks.    Vaginal discharge that smells bad.    Severe pain, cramping or tenderness in your lower belly area.    A need to urinate more frequently (use the toilet more often), more urgently (use the toilet very quickly), or it burns when you urinate.    Nausea and vomiting.    Redness, swelling or pain around a vein in your leg.    Problems breastfeeding or a red or painful area on your breast.    Chest pain and cough or are gasping for air.    Problems coping with sadness, anxiety, or depression.  If you have any concerns about hurting yourself or the baby, call your provider immediately.     You have questions or concerns after you return home.     Keep your hands clean:  Always wash your hands before touching your perineal area and stitches.  This helps reduce your risk of infection.  If your hands  aren't dirty, you may use an alcohol hand-rub to clean your hands. Keep your nails clean and short.

## 2022-07-24 ENCOUNTER — HEALTH MAINTENANCE LETTER (OUTPATIENT)
Age: 27
End: 2022-07-24

## 2022-10-03 ENCOUNTER — HEALTH MAINTENANCE LETTER (OUTPATIENT)
Age: 27
End: 2022-10-03

## 2023-08-13 ENCOUNTER — HEALTH MAINTENANCE LETTER (OUTPATIENT)
Age: 28
End: 2023-08-13

## 2024-10-05 ENCOUNTER — HEALTH MAINTENANCE LETTER (OUTPATIENT)
Age: 29
End: 2024-10-05